# Patient Record
Sex: MALE | URBAN - METROPOLITAN AREA
[De-identification: names, ages, dates, MRNs, and addresses within clinical notes are randomized per-mention and may not be internally consistent; named-entity substitution may affect disease eponyms.]

---

## 2017-08-22 ENCOUNTER — APPOINTMENT (OUTPATIENT)
Age: 52
Setting detail: DERMATOLOGY
End: 2017-09-10

## 2017-08-22 VITALS — WEIGHT: 252.8 LBS | HEART RATE: 89 BPM | OXYGEN SATURATION: 98 % | HEIGHT: 72 IN

## 2017-08-22 DIAGNOSIS — L57.0 ACTINIC KERATOSIS: ICD-10-CM

## 2017-08-22 DIAGNOSIS — D485 NEOPLASM OF UNCERTAIN BEHAVIOR OF SKIN: ICD-10-CM

## 2017-08-22 DIAGNOSIS — D22 MELANOCYTIC NEVI: ICD-10-CM

## 2017-08-22 DIAGNOSIS — L82.1 OTHER SEBORRHEIC KERATOSIS: ICD-10-CM

## 2017-08-22 DIAGNOSIS — L81.4 OTHER MELANIN HYPERPIGMENTATION: ICD-10-CM

## 2017-08-22 PROBLEM — D48.5 NEOPLASM OF UNCERTAIN BEHAVIOR OF SKIN: Status: ACTIVE | Noted: 2017-08-22

## 2017-08-22 PROBLEM — D22.5 MELANOCYTIC NEVI OF TRUNK: Status: ACTIVE | Noted: 2017-08-22

## 2017-08-22 PROCEDURE — 99214 OFFICE O/P EST MOD 30 MIN: CPT | Mod: 25

## 2017-08-22 PROCEDURE — 17003 DESTRUCT PREMALG LES 2-14: CPT

## 2017-08-22 PROCEDURE — OTHER COUNSELING: OTHER

## 2017-08-22 PROCEDURE — 11100: CPT | Mod: 59

## 2017-08-22 PROCEDURE — 17000 DESTRUCT PREMALG LESION: CPT

## 2017-08-22 PROCEDURE — OTHER LIQUID NITROGEN: OTHER

## 2017-08-22 PROCEDURE — OTHER BIOPSY BY SHAVE METHOD: OTHER

## 2017-08-22 ASSESSMENT — LOCATION SIMPLE DESCRIPTION DERM
LOCATION SIMPLE: LEFT LOWER BACK
LOCATION SIMPLE: LEFT ANTERIOR NECK
LOCATION SIMPLE: LEFT HAND
LOCATION SIMPLE: RIGHT LOWER BACK
LOCATION SIMPLE: POSTERIOR SCALP
LOCATION SIMPLE: RIGHT LOWER BACK
LOCATION SIMPLE: LEFT UPPER BACK
LOCATION SIMPLE: ABDOMEN
LOCATION SIMPLE: CHEST

## 2017-08-22 ASSESSMENT — LOCATION DETAILED DESCRIPTION DERM
LOCATION DETAILED: LEFT SUPERIOR OCCIPITAL SCALP
LOCATION DETAILED: LEFT SUPERIOR MEDIAL MIDBACK
LOCATION DETAILED: LEFT MEDIAL SUPERIOR CHEST
LOCATION DETAILED: EPIGASTRIC SKIN
LOCATION DETAILED: RIGHT SUPERIOR LATERAL LOWER BACK
LOCATION DETAILED: RIGHT SUPERIOR LATERAL LOWER BACK
LOCATION DETAILED: LEFT RADIAL DORSAL HAND
LOCATION DETAILED: LEFT MEDIAL UPPER BACK
LOCATION DETAILED: LEFT CLAVICULAR NECK

## 2017-08-22 ASSESSMENT — LOCATION ZONE DERM
LOCATION ZONE: HAND
LOCATION ZONE: NECK
LOCATION ZONE: TRUNK
LOCATION ZONE: SCALP
LOCATION ZONE: TRUNK

## 2017-08-22 NOTE — PROCEDURE: LIQUID NITROGEN
Render Post-Care Instructions In Note?: yes
Duration Of Freeze Thaw-Cycle (Seconds): 3
Post-Care Instructions: I reviewed with the patient in detail post-care instructions. Patient is to wear sunprotection, and avoid picking at any of the treated lesions. Pt may apply Vaseline to crusted or scabbing areas.
Number Of Freeze-Thaw Cycles: 2 freeze-thaw cycles
Detail Level: Detailed
Consent: The patient's verbal consent was obtained including but not limited to risks of crusting, scabbing, blistering, scarring, darker or lighter pigmentary change, recurrence, incomplete removal and infection.

## 2017-08-22 NOTE — PROCEDURE: BIOPSY BY SHAVE METHOD
Billing Type: Third-Party Bill
Silver Nitrate Text: The wound bed was treated with silver nitrate after the biopsy was performed.
Electrodesiccation And Curettage Text: The wound bed was treated with electrodesiccation and curettage after the biopsy was performed.
Bill For Surgical Tray: no
Biopsy Type: H and E
Wound Care: Vaseline
Dressing: bandage
Notification Instructions: Patient will be notified of biopsy results. However, patient instructed to call the office if not contacted within 2 weeks.
Cryotherapy Text: The wound bed was treated with cryotherapy after the biopsy was performed.
Biopsy Method: Double edge Personna blades
Curettage Text: The wound bed was treated with curettage after the biopsy was performed.
Type Of Destruction Used: Curettage
Hemostasis: Electrocautery and Aluminum Chloride
Electrodesiccation Text: The wound bed was treated with electrodesiccation after the biopsy was performed.
Anesthesia Volume In Cc (Will Not Render If 0): 0.5
Post-Care Instructions: I reviewed with the patient in detail post-care instructions. Patient is to keep the biopsy site dry overnight, and then apply Vaseline twice daily until healed.
Additional Anesthesia Volume In Cc (Will Not Render If 0): 0
Consent: Written consent was obtained and risks were reviewed including but not limited to scarring, infection, bleeding, scabbing, incomplete removal, nerve damage and allergy to anesthesia.
Anesthesia Type: 1% lidocaine with epinephrine
Detail Level: Detailed

## 2017-11-20 ENCOUNTER — APPOINTMENT (OUTPATIENT)
Age: 52
Setting detail: DERMATOLOGY
End: 2017-12-27

## 2017-11-20 DIAGNOSIS — L57.0 ACTINIC KERATOSIS: ICD-10-CM

## 2017-11-20 PROCEDURE — OTHER ADDITIONAL NOTES: OTHER

## 2017-11-20 PROCEDURE — OTHER LIQUID NITROGEN: OTHER

## 2017-11-20 PROCEDURE — 17000 DESTRUCT PREMALG LESION: CPT

## 2017-11-20 PROCEDURE — OTHER COUNSELING: OTHER

## 2017-11-20 PROCEDURE — 17003 DESTRUCT PREMALG LES 2-14: CPT

## 2017-11-20 ASSESSMENT — LOCATION SIMPLE DESCRIPTION DERM
LOCATION SIMPLE: LEFT HAND
LOCATION SIMPLE: RIGHT FOREARM
LOCATION SIMPLE: RIGHT HAND

## 2017-11-20 ASSESSMENT — LOCATION DETAILED DESCRIPTION DERM
LOCATION DETAILED: RIGHT DISTAL DORSAL FOREARM
LOCATION DETAILED: RIGHT RADIAL DORSAL HAND
LOCATION DETAILED: RIGHT PROXIMAL DORSAL FOREARM
LOCATION DETAILED: RIGHT DISTAL RADIAL DORSAL FOREARM
LOCATION DETAILED: LEFT ULNAR DORSAL HAND

## 2017-11-20 ASSESSMENT — LOCATION ZONE DERM
LOCATION ZONE: HAND
LOCATION ZONE: ARM

## 2017-11-20 NOTE — PROCEDURE: ADDITIONAL NOTES
Additional Notes: Clear on exam today: left clavicular neck,left medial superior chest, left superior occipital scalp

## 2017-11-20 NOTE — PROCEDURE: LIQUID NITROGEN
Post-Care Instructions: I reviewed with the patient in detail post-care instructions. Patient is to wear sunprotection, and avoid picking at any of the treated lesions. Pt may apply Vaseline to crusted or scabbing areas.
Detail Level: Detailed
Number Of Freeze-Thaw Cycles: 2 freeze-thaw cycles
Render Post-Care Instructions In Note?: yes
Consent: The patient's verbal consent was obtained including but not limited to risks of crusting, scabbing, blistering, scarring, darker or lighter pigmentary change, recurrence, incomplete removal and infection.
Duration Of Freeze Thaw-Cycle (Seconds): 3

## 2017-11-21 VITALS — RESPIRATION RATE: 18 BRPM | WEIGHT: 261.2 LBS | HEIGHT: 74 IN

## 2019-04-03 ENCOUNTER — APPOINTMENT (OUTPATIENT)
Age: 54
Setting detail: DERMATOLOGY
End: 2019-05-15

## 2019-04-03 VITALS — WEIGHT: 269 LBS | HEIGHT: 74 IN | RESPIRATION RATE: 18 BRPM

## 2019-04-03 DIAGNOSIS — D485 NEOPLASM OF UNCERTAIN BEHAVIOR OF SKIN: ICD-10-CM

## 2019-04-03 PROBLEM — D48.5 NEOPLASM OF UNCERTAIN BEHAVIOR OF SKIN: Status: ACTIVE | Noted: 2019-04-03

## 2019-04-03 PROCEDURE — OTHER BIOPSY BY SHAVE METHOD: OTHER

## 2019-04-03 PROCEDURE — OTHER COUNSELING: OTHER

## 2019-04-03 PROCEDURE — 11102 TANGNTL BX SKIN SINGLE LES: CPT

## 2019-04-03 PROCEDURE — OTHER MIPS QUALITY: OTHER

## 2019-04-03 ASSESSMENT — LOCATION SIMPLE DESCRIPTION DERM: LOCATION SIMPLE: LEFT HAND

## 2019-04-03 ASSESSMENT — LOCATION ZONE DERM: LOCATION ZONE: HAND

## 2019-04-03 ASSESSMENT — LOCATION DETAILED DESCRIPTION DERM: LOCATION DETAILED: LEFT RADIAL DORSAL HAND

## 2019-04-03 NOTE — PROCEDURE: BIOPSY BY SHAVE METHOD
Was A Bandage Applied: Yes
Anesthesia Volume In Cc (Will Not Render If 0): 0.5
Dressing: bandage
Curettage Text: The wound bed was treated with curettage after the biopsy was performed.
Bill 08016 For Specimen Handling/Conveyance To Laboratory?: no
Silver Nitrate Text: The wound bed was treated with silver nitrate after the biopsy was performed.
Electrodesiccation Text: The wound bed was treated with electrodesiccation after the biopsy was performed.
Biopsy Method: Dermablade
Biopsy Type: H and E
Depth Of Biopsy: dermis
Hemostasis: Drysol
Cryotherapy Text: The wound bed was treated with cryotherapy after the biopsy was performed.
X Size Of Lesion In Cm: 0
Type Of Destruction Used: Curettage
Wound Care: Petrolatum
Consent: Written consent was obtained and risks were reviewed including but not limited to scarring, infection, bleeding, scabbing, incomplete removal, nerve damage and allergy to anesthesia.
Post-Care Instructions: I reviewed with the patient in detail post-care instructions. Patient is to keep the biopsy site dry overnight, and then apply bacitracin twice daily until healed. Patient may apply hydrogen peroxide soaks to remove any crusting.
Anesthesia Type: 1% lidocaine with epinephrine
Detail Level: Detailed
Billing Type: Third-Party Bill
Notification Instructions: Patient will be notified of biopsy results. However, patient instructed to call the office if not contacted within 2 weeks.
Electrodesiccation And Curettage Text: The wound bed was treated with electrodesiccation and curettage after the biopsy was performed.

## 2019-04-03 NOTE — PROCEDURE: MIPS QUALITY
Additional Notes: The patient will be referred back to the primary care physician for BMI management.
Detail Level: Detailed
Quality 128: Preventive Care And Screening: Body Mass Index (Bmi) Screening And Follow-Up Plan: BMI is documented above normal parameters and a follow-up plan is documented
Quality 226: Preventive Care And Screening: Tobacco Use: Screening And Cessation Intervention: Patient screened for tobacco use and is an ex/non-smoker

## 2019-08-19 ENCOUNTER — APPOINTMENT (OUTPATIENT)
Age: 54
Setting detail: DERMATOLOGY
End: 2019-10-04

## 2019-08-19 VITALS — HEIGHT: 74 IN | RESPIRATION RATE: 18 BRPM | WEIGHT: 276.2 LBS

## 2019-08-19 DIAGNOSIS — L72.0 EPIDERMAL CYST: ICD-10-CM

## 2019-08-19 DIAGNOSIS — L57.0 ACTINIC KERATOSIS: ICD-10-CM

## 2019-08-19 DIAGNOSIS — L82.1 OTHER SEBORRHEIC KERATOSIS: ICD-10-CM

## 2019-08-19 DIAGNOSIS — L73.8 OTHER SPECIFIED FOLLICULAR DISORDERS: ICD-10-CM

## 2019-08-19 DIAGNOSIS — D22 MELANOCYTIC NEVI: ICD-10-CM

## 2019-08-19 PROBLEM — D22.5 MELANOCYTIC NEVI OF TRUNK: Status: ACTIVE | Noted: 2019-08-19

## 2019-08-19 PROCEDURE — OTHER DEFER: OTHER

## 2019-08-19 PROCEDURE — OTHER OBSERVATION AND MEASURE: OTHER

## 2019-08-19 PROCEDURE — 99213 OFFICE O/P EST LOW 20 MIN: CPT

## 2019-08-19 PROCEDURE — OTHER REASSURANCE: OTHER

## 2019-08-19 PROCEDURE — OTHER COUNSELING: OTHER

## 2019-08-19 PROCEDURE — OTHER PHOTO-DOCUMENTATION: OTHER

## 2019-08-19 PROCEDURE — OTHER ADDITIONAL NOTES: OTHER

## 2019-08-19 PROCEDURE — OTHER OBSERVATION: OTHER

## 2019-08-19 ASSESSMENT — LOCATION DETAILED DESCRIPTION DERM
LOCATION DETAILED: RIGHT PROXIMAL DORSAL FOREARM
LOCATION DETAILED: RIGHT RADIAL DORSAL HAND
LOCATION DETAILED: LEFT RADIAL DORSAL HAND
LOCATION DETAILED: LEFT POSTERIOR SHOULDER
LOCATION DETAILED: LEFT MEDIAL FRONTAL SCALP
LOCATION DETAILED: RIGHT RIB CAGE
LOCATION DETAILED: RIGHT CLAVICULAR SKIN

## 2019-08-19 ASSESSMENT — LOCATION SIMPLE DESCRIPTION DERM
LOCATION SIMPLE: LEFT HAND
LOCATION SIMPLE: RIGHT CLAVICULAR SKIN
LOCATION SIMPLE: LEFT SHOULDER
LOCATION SIMPLE: RIGHT HAND
LOCATION SIMPLE: LEFT SCALP
LOCATION SIMPLE: RIGHT FOREARM
LOCATION SIMPLE: ABDOMEN

## 2019-08-19 ASSESSMENT — LOCATION ZONE DERM
LOCATION ZONE: HAND
LOCATION ZONE: ARM
LOCATION ZONE: SCALP
LOCATION ZONE: TRUNK

## 2019-08-19 NOTE — PROCEDURE: DEFER
Reason To Defer Override: patient will consider removal when cyst becomes bothersome
Introduction Text (Please End With A Colon): The following procedure was deferred:
Detail Level: Detailed
Procedure To Be Performed At Next Visit: Biopsy by punch method

## 2019-08-19 NOTE — PROCEDURE: OBSERVATION
Detail Level: Detailed
Size Of Lesion: 0.5x0.4 cm
Morphology Per Location (Optional): Flesh colored smooth papule with no atypical features

## 2020-07-27 ENCOUNTER — APPOINTMENT (OUTPATIENT)
Age: 55
Setting detail: DERMATOLOGY
End: 2020-07-28

## 2020-07-27 VITALS — HEIGHT: 74 IN | TEMPERATURE: 98.6 F | WEIGHT: 272 LBS | RESPIRATION RATE: 18 BRPM

## 2020-07-27 DIAGNOSIS — D485 NEOPLASM OF UNCERTAIN BEHAVIOR OF SKIN: ICD-10-CM

## 2020-07-27 DIAGNOSIS — L20.89 OTHER ATOPIC DERMATITIS: ICD-10-CM

## 2020-07-27 DIAGNOSIS — L0391 CELLULITIS AND ABSCESS OF UNSPECIFIED SITES: ICD-10-CM

## 2020-07-27 DIAGNOSIS — L0390 CELLULITIS AND ABSCESS OF UNSPECIFIED SITES: ICD-10-CM

## 2020-07-27 PROBLEM — L20.84 INTRINSIC (ALLERGIC) ECZEMA: Status: ACTIVE | Noted: 2020-07-27

## 2020-07-27 PROBLEM — L02.211 CUTANEOUS ABSCESS OF ABDOMINAL WALL: Status: ACTIVE | Noted: 2020-07-27

## 2020-07-27 PROBLEM — D48.5 NEOPLASM OF UNCERTAIN BEHAVIOR OF SKIN: Status: ACTIVE | Noted: 2020-07-27

## 2020-07-27 PROCEDURE — OTHER BIOPSY BY SHAVE METHOD: OTHER

## 2020-07-27 PROCEDURE — OTHER COUNSELING: OTHER

## 2020-07-27 PROCEDURE — 11102 TANGNTL BX SKIN SINGLE LES: CPT

## 2020-07-27 PROCEDURE — OTHER TREATMENT REGIMEN: OTHER

## 2020-07-27 PROCEDURE — OTHER PHOTO-DOCUMENTATION: OTHER

## 2020-07-27 PROCEDURE — 11103 TANGNTL BX SKIN EA SEP/ADDL: CPT

## 2020-07-27 PROCEDURE — OTHER PRESCRIPTION: OTHER

## 2020-07-27 PROCEDURE — 99213 OFFICE O/P EST LOW 20 MIN: CPT | Mod: 25

## 2020-07-27 PROCEDURE — OTHER MEDICATION COUNSELING: OTHER

## 2020-07-27 RX ORDER — CLOBETASOL PROPIONATE 0.5 MG/G
CREAM TOPICAL BID
Qty: 1 | Refills: 2 | Status: ERX | COMMUNITY
Start: 2020-07-27

## 2020-07-27 RX ORDER — DOXYCYCLINE HYCLATE 100 MG/1
TABLET, COATED ORAL
Qty: 20 | Refills: 0 | Status: ERX | COMMUNITY
Start: 2020-07-27

## 2020-07-27 ASSESSMENT — LOCATION SIMPLE DESCRIPTION DERM
LOCATION SIMPLE: ABDOMEN
LOCATION SIMPLE: RIGHT PRETIBIAL REGION
LOCATION SIMPLE: LEFT SCALP
LOCATION SIMPLE: POSTERIOR SCALP

## 2020-07-27 ASSESSMENT — LOCATION DETAILED DESCRIPTION DERM
LOCATION DETAILED: RIGHT PROXIMAL PRETIBIAL REGION
LOCATION DETAILED: LEFT CENTRAL FRONTAL SCALP
LOCATION DETAILED: RIGHT RIB CAGE
LOCATION DETAILED: RIGHT LATERAL ABDOMEN
LOCATION DETAILED: POSTERIOR MID-PARIETAL SCALP

## 2020-07-27 ASSESSMENT — LOCATION ZONE DERM
LOCATION ZONE: TRUNK
LOCATION ZONE: LEG
LOCATION ZONE: SCALP

## 2020-07-27 NOTE — PROCEDURE: BIOPSY BY SHAVE METHOD
Hide Topical Anesthesia?: No
Anesthesia Volume In Cc (Will Not Render If 0): 0.5
Electrodesiccation Text: The wound bed was treated with electrodesiccation after the biopsy was performed.
Size Of Lesion In Cm: 0
Detail Level: Detailed
Post-Care Instructions: I reviewed with the patient in detail post-care instructions. Patient is to keep the biopsy site dry overnight, and then apply bacitracin twice daily until healed. Patient may apply hydrogen peroxide soaks to remove any crusting.
Anesthesia Type: 1% lidocaine with epinephrine
Biopsy Method: Dermablade
Depth Of Biopsy: dermis
Electrodesiccation And Curettage Text: The wound bed was treated with electrodesiccation and curettage after the biopsy was performed.
Consent: Written consent was obtained and risks were reviewed including but not limited to scarring, infection, bleeding, scabbing, incomplete removal, nerve damage and allergy to anesthesia.
Type Of Destruction Used: Curettage
Wound Care: Petrolatum
Hemostasis: Drysol
Billing Type: Third-Party Bill
Cryotherapy Text: The wound bed was treated with cryotherapy after the biopsy was performed.
Was A Bandage Applied: Yes
Biopsy Type: H and E
Dressing: bandage
Notification Instructions: Patient will be notified of biopsy results. However, patient instructed to call the office if not contacted within 2 weeks.
Silver Nitrate Text: The wound bed was treated with silver nitrate after the biopsy was performed.
Information: Selecting Yes will display possible errors in your note based on the variables you have selected. This validation is only offered as a suggestion for you. PLEASE NOTE THAT THE VALIDATION TEXT WILL BE REMOVED WHEN YOU FINALIZE YOUR NOTE. IF YOU WANT TO FAX A PRELIMINARY NOTE YOU WILL NEED TO TOGGLE THIS TO 'NO' IF YOU DO NOT WANT IT IN YOUR FAXED NOTE.
Curettage Text: The wound bed was treated with curettage after the biopsy was performed.

## 2020-07-27 NOTE — PROCEDURE: MEDICATION COUNSELING
Xelmelz Pregnancy And Lactation Text: This medication is Pregnancy Category D and is not considered safe during pregnancy.  The risk during breast feeding is also uncertain.

## 2020-07-27 NOTE — HPI: RASH
What Type Of Note Output Would You Prefer (Optional)?: Bullet Format
How Severe Is Your Rash?: moderate
Is This A New Presentation, Or A Follow-Up?: Rash
Additional History: improves with clobetasol

## 2020-07-27 NOTE — PROCEDURE: TREATMENT REGIMEN
Initiate Treatment: doxycycline hyclate 100 mg tablet. Take 1 pill po twice a day x 10 days with food
Plan: Return to clinic as needed
Continue Regimen: clobetasol 0.05 % topical cream. apply a thin layer to affected areas BID x 2 weeks stop x 1 week. Repeat as needed
Detail Level: Zone

## 2020-08-27 ENCOUNTER — APPOINTMENT (OUTPATIENT)
Age: 55
Setting detail: DERMATOLOGY
End: 2020-09-01

## 2020-08-27 ENCOUNTER — RX ONLY (RX ONLY)
Age: 55
End: 2020-08-27

## 2020-08-27 PROBLEM — D04.4 CARCINOMA IN SITU OF SKIN OF SCALP AND NECK: Status: ACTIVE | Noted: 2020-08-27

## 2020-08-27 PROCEDURE — 13121 CMPLX RPR S/A/L 2.6-7.5 CM: CPT

## 2020-08-27 PROCEDURE — OTHER MOHS SURGERY: OTHER

## 2020-08-27 PROCEDURE — 17311 MOHS 1 STAGE H/N/HF/G: CPT

## 2020-08-27 RX ORDER — CEPHALEXIN 500 MG/1
CAPSULE ORAL
Qty: 21 | Refills: 0 | Status: ERX | COMMUNITY
Start: 2020-08-27

## 2020-09-24 ENCOUNTER — APPOINTMENT (OUTPATIENT)
Age: 55
Setting detail: DERMATOLOGY
End: 2020-09-25

## 2020-09-24 ENCOUNTER — RX ONLY (RX ONLY)
Age: 55
End: 2020-09-24

## 2020-09-24 PROBLEM — D04.4 CARCINOMA IN SITU OF SKIN OF SCALP AND NECK: Status: ACTIVE | Noted: 2020-09-24

## 2020-09-24 PROCEDURE — OTHER MOHS SURGERY: OTHER

## 2020-09-24 PROCEDURE — 17311 MOHS 1 STAGE H/N/HF/G: CPT

## 2020-09-24 RX ORDER — MUPIROCIN 20 MG/G
OINTMENT TOPICAL
Qty: 1 | Refills: 0 | Status: ERX | COMMUNITY
Start: 2020-09-24

## 2020-09-24 NOTE — PROCEDURE: MOHS SURGERY
"Chief Complaint   Patient presents with     Kidney Problem       Initial /78 (BP Location: Right arm, Cuff Size: Adult Large)  Pulse 98  Temp 98  F (36.7  C) (Oral)  Ht 5' 7\" (1.702 m)  Wt 274 lb (124.3 kg)  SpO2 96%  BMI 42.91 kg/m2 Estimated body mass index is 42.91 kg/(m^2) as calculated from the following:    Height as of this encounter: 5' 7\" (1.702 m).    Weight as of this encounter: 274 lb (124.3 kg).  Medication Reconciliation: complete   Estela Guevara MA    " Crescentic Intermediate Repair Preamble Text (Leave Blank If You Do Not Want): Undermining was performed with blunt dissection.

## 2021-02-04 ENCOUNTER — APPOINTMENT (OUTPATIENT)
Age: 56
Setting detail: DERMATOLOGY
End: 2021-02-16

## 2021-02-04 VITALS — HEIGHT: 74 IN | RESPIRATION RATE: 18 BRPM | TEMPERATURE: 97.8 F | WEIGHT: 278 LBS

## 2021-02-04 DIAGNOSIS — L20.89 OTHER ATOPIC DERMATITIS: ICD-10-CM

## 2021-02-04 DIAGNOSIS — Z86.007 PERSONAL HISTORY OF IN-SITU NEOPLASM OF SKIN: ICD-10-CM

## 2021-02-04 DIAGNOSIS — L81.4 OTHER MELANIN HYPERPIGMENTATION: ICD-10-CM

## 2021-02-04 DIAGNOSIS — L82.1 OTHER SEBORRHEIC KERATOSIS: ICD-10-CM

## 2021-02-04 DIAGNOSIS — D22 MELANOCYTIC NEVI: ICD-10-CM

## 2021-02-04 DIAGNOSIS — Z71.89 OTHER SPECIFIED COUNSELING: ICD-10-CM

## 2021-02-04 DIAGNOSIS — D18.0 HEMANGIOMA: ICD-10-CM

## 2021-02-04 DIAGNOSIS — D49.2 NEOPLASM OF UNSPECIFIED BEHAVIOR OF BONE, SOFT TISSUE, AND SKIN: ICD-10-CM

## 2021-02-04 DIAGNOSIS — L72.8 OTHER FOLLICULAR CYSTS OF THE SKIN AND SUBCUTANEOUS TISSUE: ICD-10-CM

## 2021-02-04 PROBLEM — D18.01 HEMANGIOMA OF SKIN AND SUBCUTANEOUS TISSUE: Status: ACTIVE | Noted: 2021-02-04

## 2021-02-04 PROBLEM — D22.5 MELANOCYTIC NEVI OF TRUNK: Status: ACTIVE | Noted: 2021-02-04

## 2021-02-04 PROBLEM — L20.84 INTRINSIC (ALLERGIC) ECZEMA: Status: ACTIVE | Noted: 2021-02-04

## 2021-02-04 PROBLEM — Z85.828 PERSONAL HISTORY OF OTHER MALIGNANT NEOPLASM OF SKIN: Status: ACTIVE | Noted: 2021-02-04

## 2021-02-04 PROCEDURE — OTHER COUNSELING: OTHER

## 2021-02-04 PROCEDURE — OTHER OBSERVATION: OTHER

## 2021-02-04 PROCEDURE — 99213 OFFICE O/P EST LOW 20 MIN: CPT

## 2021-02-04 PROCEDURE — OTHER PRESCRIPTION MEDICATION MANAGEMENT: OTHER

## 2021-02-04 PROCEDURE — OTHER ADDITIONAL NOTES: OTHER

## 2021-02-04 PROCEDURE — OTHER MIPS QUALITY: OTHER

## 2021-02-04 ASSESSMENT — LOCATION DETAILED DESCRIPTION DERM
LOCATION DETAILED: RIGHT RIB CAGE
LOCATION DETAILED: RIGHT DORSAL FOOT
LOCATION DETAILED: RIGHT INFERIOR MEDIAL UPPER BACK
LOCATION DETAILED: PERIUMBILICAL SKIN
LOCATION DETAILED: SUPERIOR MID FOREHEAD
LOCATION DETAILED: LEFT DORSAL FOOT
LOCATION DETAILED: EPIGASTRIC SKIN

## 2021-02-04 ASSESSMENT — LOCATION SIMPLE DESCRIPTION DERM
LOCATION SIMPLE: SUPERIOR FOREHEAD
LOCATION SIMPLE: RIGHT UPPER BACK
LOCATION SIMPLE: LEFT FOOT
LOCATION SIMPLE: RIGHT FOOT
LOCATION SIMPLE: ABDOMEN

## 2021-02-04 ASSESSMENT — LOCATION ZONE DERM
LOCATION ZONE: TRUNK
LOCATION ZONE: FACE
LOCATION ZONE: FEET

## 2021-02-04 NOTE — PROCEDURE: ADDITIONAL NOTES
Additional Notes: Posterior mid Parietal scalp- Mohs Dr.Smith 8/27/2020 \\n left central frontal scalp, Mohs 09/24/20 w/Dr. Ambriz
Detail Level: Zone
Render Risk Assessment In Note?: no

## 2021-02-04 NOTE — PROCEDURE: PRESCRIPTION MEDICATION MANAGEMENT
Render In Strict Bullet Format?: No
Plan: Monitor unless lesion becomes bothersome or increases in size
Plan: Offered topical steroid patient declined he will continue using \\nOTC Debbie
Detail Level: Zone

## 2021-05-10 ENCOUNTER — APPOINTMENT (OUTPATIENT)
Age: 56
Setting detail: DERMATOLOGY
End: 2021-05-11

## 2021-05-10 VITALS — TEMPERATURE: 98 F | WEIGHT: 278 LBS | RESPIRATION RATE: 18 BRPM | HEIGHT: 60 IN

## 2021-05-10 DIAGNOSIS — B07.8 OTHER VIRAL WARTS: ICD-10-CM

## 2021-05-10 PROCEDURE — OTHER COUNSELING: OTHER

## 2021-05-10 PROCEDURE — 17110 DESTRUCT B9 LESION 1-14: CPT

## 2021-05-10 PROCEDURE — OTHER BENIGN DESTRUCTION: OTHER

## 2021-05-10 ASSESSMENT — LOCATION SIMPLE DESCRIPTION DERM: LOCATION SIMPLE: RIGHT HAND

## 2021-05-10 ASSESSMENT — LOCATION DETAILED DESCRIPTION DERM: LOCATION DETAILED: RIGHT DORSAL SMALL METACARPOPHALANGEAL JOINT

## 2021-05-10 ASSESSMENT — LOCATION ZONE DERM: LOCATION ZONE: HAND

## 2021-05-10 NOTE — PROCEDURE: BENIGN DESTRUCTION
Add 52 Modifier (Optional): no
Anesthesia Volume In Cc: 0.5
Consent: The patient's consent was obtained including but not limited to risks of crusting, scabbing, blistering, scarring, darker or lighter pigmentary change, recurrence, incomplete removal and infection.
Post-Care Instructions: I reviewed with the patient in detail post-care instructions. Patient is to wear sunprotection, and avoid picking at any of the treated lesions. Pt may apply Vaseline to crusted or scabbing areas.
Detail Level: Detailed
Medical Necessity Information: It is in your best interest to select a reason for this procedure from the list below. All of these items fulfill various CMS LCD requirements except the new and changing color options.
Treatment Number (Will Not Render If 0): 0
Medical Necessity Clause: This procedure was medically necessary because the lesions that were treated were:

## 2021-06-21 ENCOUNTER — APPOINTMENT (OUTPATIENT)
Dept: URBAN - METROPOLITAN AREA CLINIC 265 | Age: 56
Setting detail: DERMATOLOGY
End: 2021-06-22

## 2021-06-21 VITALS — WEIGHT: 278 LBS | RESPIRATION RATE: 18 BRPM | HEIGHT: 74 IN

## 2021-06-21 DIAGNOSIS — B07.8 OTHER VIRAL WARTS: ICD-10-CM

## 2021-06-21 PROCEDURE — 17110 DESTRUCT B9 LESION 1-14: CPT

## 2021-06-21 PROCEDURE — OTHER BENIGN DESTRUCTION: OTHER

## 2021-06-21 PROCEDURE — OTHER COUNSELING: OTHER

## 2021-06-21 ASSESSMENT — LOCATION ZONE DERM: LOCATION ZONE: HAND

## 2021-06-21 ASSESSMENT — LOCATION SIMPLE DESCRIPTION DERM: LOCATION SIMPLE: RIGHT HAND

## 2021-06-21 ASSESSMENT — LOCATION DETAILED DESCRIPTION DERM: LOCATION DETAILED: RIGHT DORSAL SMALL METACARPOPHALANGEAL JOINT

## 2021-06-21 NOTE — PROCEDURE: BENIGN DESTRUCTION
Treatment Number (Will Not Render If 0): 0
Anesthesia Volume In Cc: 0.5
Medical Necessity Clause: This procedure was medically necessary because the lesions that were treated were:
Post-Care Instructions: I reviewed with the patient in detail post-care instructions. Patient is to wear sunprotection, and avoid picking at any of the treated lesions. Pt may apply Vaseline to crusted or scabbing areas.
Render Note In Bullet Format When Appropriate: No
Detail Level: Simple
Medical Necessity Information: It is in your best interest to select a reason for this procedure from the list below. All of these items fulfill various CMS LCD requirements except the new and changing color options.
Consent: The patient's consent was obtained including but not limited to risks of crusting, scabbing, blistering, scarring, darker or lighter pigmentary change, recurrence, incomplete removal and infection.

## 2022-02-14 ENCOUNTER — APPOINTMENT (OUTPATIENT)
Dept: URBAN - METROPOLITAN AREA CLINIC 265 | Age: 57
Setting detail: DERMATOLOGY
End: 2022-04-19

## 2022-02-14 VITALS — WEIGHT: 264 LBS | HEIGHT: 74 IN

## 2022-02-14 DIAGNOSIS — Z71.89 OTHER SPECIFIED COUNSELING: ICD-10-CM

## 2022-02-14 DIAGNOSIS — L57.0 ACTINIC KERATOSIS: ICD-10-CM

## 2022-02-14 DIAGNOSIS — L81.4 OTHER MELANIN HYPERPIGMENTATION: ICD-10-CM

## 2022-02-14 DIAGNOSIS — D18.0 HEMANGIOMA: ICD-10-CM

## 2022-02-14 DIAGNOSIS — L82.1 OTHER SEBORRHEIC KERATOSIS: ICD-10-CM

## 2022-02-14 DIAGNOSIS — D22 MELANOCYTIC NEVI: ICD-10-CM

## 2022-02-14 DIAGNOSIS — Z86.007 PERSONAL HISTORY OF IN-SITU NEOPLASM OF SKIN: ICD-10-CM

## 2022-02-14 PROBLEM — D18.01 HEMANGIOMA OF SKIN AND SUBCUTANEOUS TISSUE: Status: ACTIVE | Noted: 2022-02-14

## 2022-02-14 PROBLEM — D22.5 MELANOCYTIC NEVI OF TRUNK: Status: ACTIVE | Noted: 2022-02-14

## 2022-02-14 PROCEDURE — OTHER ADDITIONAL NOTES: OTHER

## 2022-02-14 PROCEDURE — OTHER COUNSELING: OTHER

## 2022-02-14 PROCEDURE — OTHER LIQUID NITROGEN: OTHER

## 2022-02-14 PROCEDURE — 17003 DESTRUCT PREMALG LES 2-14: CPT

## 2022-02-14 PROCEDURE — 99213 OFFICE O/P EST LOW 20 MIN: CPT | Mod: 25

## 2022-02-14 PROCEDURE — OTHER MIPS QUALITY: OTHER

## 2022-02-14 PROCEDURE — 17000 DESTRUCT PREMALG LESION: CPT

## 2022-02-14 ASSESSMENT — LOCATION SIMPLE DESCRIPTION DERM
LOCATION SIMPLE: RIGHT UPPER BACK
LOCATION SIMPLE: RIGHT FOREHEAD
LOCATION SIMPLE: LEFT TEMPLE
LOCATION SIMPLE: ABDOMEN
LOCATION SIMPLE: RIGHT CHEEK
LOCATION SIMPLE: LEFT FOREARM

## 2022-02-14 ASSESSMENT — LOCATION DETAILED DESCRIPTION DERM
LOCATION DETAILED: EPIGASTRIC SKIN
LOCATION DETAILED: PERIUMBILICAL SKIN
LOCATION DETAILED: RIGHT SUPERIOR PREAURICULAR CHEEK
LOCATION DETAILED: RIGHT SUPERIOR MEDIAL FOREHEAD
LOCATION DETAILED: RIGHT INFERIOR MEDIAL UPPER BACK
LOCATION DETAILED: LEFT DISTAL DORSAL FOREARM
LOCATION DETAILED: LEFT CENTRAL TEMPLE

## 2022-02-14 ASSESSMENT — LOCATION ZONE DERM
LOCATION ZONE: TRUNK
LOCATION ZONE: FACE
LOCATION ZONE: ARM

## 2022-02-14 NOTE — PROCEDURE: LIQUID NITROGEN
Post-Care Instructions: I reviewed with the patient in detail post-care instructions. Patient is to wear sunprotection, and avoid picking at any of the treated lesions. Pt may apply Vaseline to crusted or scabbing areas.
Detail Level: Zone
Duration Of Freeze Thaw-Cycle (Seconds): 3
Render Note In Bullet Format When Appropriate: No
Number Of Freeze-Thaw Cycles: 2 freeze-thaw cycles
Consent: The patient's consent was obtained including but not limited to risks of crusting, scabbing, blistering, scarring, darker or lighter pigmentary change, recurrence, incomplete removal and infection.
Show Applicator Variable?: Yes

## 2022-09-12 ENCOUNTER — APPOINTMENT (OUTPATIENT)
Dept: URBAN - METROPOLITAN AREA CLINIC 265 | Age: 57
Setting detail: DERMATOLOGY
End: 2022-09-12

## 2022-09-12 VITALS — HEIGHT: 74 IN | WEIGHT: 276 LBS | RESPIRATION RATE: 18 BRPM

## 2022-09-12 DIAGNOSIS — L82.0 INFLAMED SEBORRHEIC KERATOSIS: ICD-10-CM

## 2022-09-12 PROCEDURE — OTHER COUNSELING: OTHER

## 2022-09-12 PROCEDURE — OTHER MIPS QUALITY: OTHER

## 2022-09-12 PROCEDURE — OTHER LIQUID NITROGEN: OTHER

## 2022-09-12 PROCEDURE — 17110 DESTRUCT B9 LESION 1-14: CPT

## 2022-09-12 ASSESSMENT — LOCATION ZONE DERM
LOCATION ZONE: HAND
LOCATION ZONE: SCALP
LOCATION ZONE: ARM

## 2022-09-12 ASSESSMENT — LOCATION DETAILED DESCRIPTION DERM
LOCATION DETAILED: 4TH WEB SPACE LEFT HAND
LOCATION DETAILED: 3RD WEB SPACE LEFT HAND
LOCATION DETAILED: LEFT MEDIAL FRONTAL SCALP
LOCATION DETAILED: LEFT DISTAL DORSAL FOREARM
LOCATION DETAILED: LEFT RADIAL DORSAL HAND
LOCATION DETAILED: LEFT ULNAR DORSAL HAND

## 2022-09-12 ASSESSMENT — LOCATION SIMPLE DESCRIPTION DERM
LOCATION SIMPLE: LEFT FOREARM
LOCATION SIMPLE: LEFT HAND
LOCATION SIMPLE: LEFT SCALP

## 2022-09-12 NOTE — PROCEDURE: LIQUID NITROGEN
Spray Paint Technique: No
Show Topical Anesthesia Variable?: Yes
Number Of Freeze-Thaw Cycles: 2 freeze-thaw cycles
Duration Of Freeze Thaw-Cycle (Seconds): 3
Medical Necessity Information: It is in your best interest to select a reason for this procedure from the list below. All of these items fulfill various CMS LCD requirements except the new and changing color options.
Medical Necessity Clause: This procedure was medically necessary because the lesions that were treated were:
Post-Care Instructions: I reviewed with the patient in detail post-care instructions. Patient is to wear sunprotection, and avoid picking at any of the treated lesions. Pt may apply Vaseline to crusted or scabbing areas.
Detail Level: Detailed
Spray Paint Text: The liquid nitrogen was applied to the skin utilizing a spray paint frosting technique.
Consent: The patient's consent was obtained including but not limited to risks of crusting, scabbing, blistering, scarring, darker or lighter pigmentary change, recurrence, incomplete removal and infection.

## 2023-02-20 ENCOUNTER — APPOINTMENT (OUTPATIENT)
Dept: URBAN - METROPOLITAN AREA CLINIC 265 | Age: 58
Setting detail: DERMATOLOGY
End: 2023-02-25

## 2023-02-20 DIAGNOSIS — D22 MELANOCYTIC NEVI: ICD-10-CM

## 2023-02-20 DIAGNOSIS — Z85.828 PERSONAL HISTORY OF OTHER MALIGNANT NEOPLASM OF SKIN: ICD-10-CM

## 2023-02-20 DIAGNOSIS — D49.2 NEOPLASM OF UNSPECIFIED BEHAVIOR OF BONE, SOFT TISSUE, AND SKIN: ICD-10-CM

## 2023-02-20 DIAGNOSIS — L57.0 ACTINIC KERATOSIS: ICD-10-CM

## 2023-02-20 DIAGNOSIS — L82.1 OTHER SEBORRHEIC KERATOSIS: ICD-10-CM

## 2023-02-20 DIAGNOSIS — D18.0 HEMANGIOMA: ICD-10-CM

## 2023-02-20 DIAGNOSIS — L81.4 OTHER MELANIN HYPERPIGMENTATION: ICD-10-CM

## 2023-02-20 DIAGNOSIS — Z71.89 OTHER SPECIFIED COUNSELING: ICD-10-CM

## 2023-02-20 PROBLEM — D18.01 HEMANGIOMA OF SKIN AND SUBCUTANEOUS TISSUE: Status: ACTIVE | Noted: 2023-02-20

## 2023-02-20 PROBLEM — D22.5 MELANOCYTIC NEVI OF TRUNK: Status: ACTIVE | Noted: 2023-02-20

## 2023-02-20 PROCEDURE — OTHER COUNSELING: OTHER

## 2023-02-20 PROCEDURE — 99213 OFFICE O/P EST LOW 20 MIN: CPT | Mod: 25

## 2023-02-20 PROCEDURE — 11103 TANGNTL BX SKIN EA SEP/ADDL: CPT

## 2023-02-20 PROCEDURE — OTHER MIPS QUALITY: OTHER

## 2023-02-20 PROCEDURE — 17003 DESTRUCT PREMALG LES 2-14: CPT

## 2023-02-20 PROCEDURE — OTHER BIOPSY BY SHAVE METHOD: OTHER

## 2023-02-20 PROCEDURE — 11102 TANGNTL BX SKIN SINGLE LES: CPT

## 2023-02-20 PROCEDURE — OTHER ADDITIONAL NOTES: OTHER

## 2023-02-20 PROCEDURE — 17000 DESTRUCT PREMALG LESION: CPT | Mod: 59

## 2023-02-20 PROCEDURE — OTHER LIQUID NITROGEN: OTHER

## 2023-02-20 ASSESSMENT — LOCATION DETAILED DESCRIPTION DERM
LOCATION DETAILED: RIGHT INFERIOR MEDIAL UPPER BACK
LOCATION DETAILED: PERIUMBILICAL SKIN
LOCATION DETAILED: LEFT ULNAR DORSAL HAND
LOCATION DETAILED: LEFT RADIAL DORSAL HAND
LOCATION DETAILED: LEFT DORSAL MIDDLE METACARPOPHALANGEAL JOINT
LOCATION DETAILED: EPIGASTRIC SKIN

## 2023-02-20 ASSESSMENT — LOCATION SIMPLE DESCRIPTION DERM
LOCATION SIMPLE: LEFT HAND
LOCATION SIMPLE: RIGHT UPPER BACK
LOCATION SIMPLE: ABDOMEN

## 2023-02-20 ASSESSMENT — LOCATION ZONE DERM
LOCATION ZONE: TRUNK
LOCATION ZONE: HAND

## 2023-02-20 NOTE — PROCEDURE: BIOPSY BY SHAVE METHOD
Information: Selecting Yes will display possible errors in your note based on the variables you have selected. This validation is only offered as a suggestion for you. PLEASE NOTE THAT THE VALIDATION TEXT WILL BE REMOVED WHEN YOU FINALIZE YOUR NOTE. IF YOU WANT TO FAX A PRELIMINARY NOTE YOU WILL NEED TO TOGGLE THIS TO 'NO' IF YOU DO NOT WANT IT IN YOUR FAXED NOTE.
Hide Second Anesthesia?: No
Biopsy Method: Dermablade
Anesthesia Type: 1% lidocaine with epinephrine
Electrodesiccation Text: The wound bed was treated with electrodesiccation after the biopsy was performed.
Anesthesia Volume In Cc (Will Not Render If 0): 0.5
Was A Bandage Applied: Yes
Cryotherapy Text: The wound bed was treated with cryotherapy after the biopsy was performed.
Biopsy Type: H and E
Type Of Destruction Used: Curettage
Wound Care: Petrolatum
X Size Of Lesion In Cm: 0.4
Billing Type: Third-Party Bill
Notification Instructions: Patient will be notified of biopsy results. However, patient instructed to call the office if not contacted within 2 weeks.
Curettage Text: The wound bed was treated with curettage after the biopsy was performed.
X Size Of Lesion In Cm: 0.6
Depth Of Biopsy: dermis
Additional Anesthesia Volume In Cc (Will Not Render If 0): 0
Post-Care Instructions: I reviewed with the patient in detail post-care instructions. Patient is to keep the biopsy site dry overnight, and then apply bacitracin twice daily until healed. Patient may apply hydrogen peroxide soaks to remove any crusting.
Silver Nitrate Text: The wound bed was treated with silver nitrate after the biopsy was performed.
Hemostasis: Drysol
Consent: Written consent was obtained and risks were reviewed including but not limited to scarring, infection, bleeding, scabbing, incomplete removal, nerve damage and allergy to anesthesia.
Detail Level: Detailed
Dressing: bandage
Electrodesiccation And Curettage Text: The wound bed was treated with electrodesiccation and curettage after the biopsy was performed.

## 2023-02-20 NOTE — PROCEDURE: LIQUID NITROGEN
Post-Care Instructions: I reviewed with the patient in detail post-care instructions. Patient is to wear sunprotection, and avoid picking at any of the treated lesions. Pt may apply Vaseline to crusted or scabbing areas.
Detail Level: Detailed
Number Of Freeze-Thaw Cycles: 2 freeze-thaw cycles
Duration Of Freeze Thaw-Cycle (Seconds): 3
Consent: The patient's consent was obtained including but not limited to risks of crusting, scabbing, blistering, scarring, darker or lighter pigmentary change, recurrence, incomplete removal and infection.
Render Note In Bullet Format When Appropriate: No
Show Applicator Variable?: Yes

## 2023-06-14 ENCOUNTER — APPOINTMENT (OUTPATIENT)
Dept: URBAN - METROPOLITAN AREA CLINIC 298 | Age: 58
Setting detail: DERMATOLOGY
End: 2023-06-14

## 2023-06-14 DIAGNOSIS — L57.0 ACTINIC KERATOSIS: ICD-10-CM

## 2023-06-14 PROCEDURE — OTHER LIQUID NITROGEN: OTHER

## 2023-06-14 PROCEDURE — OTHER COUNSELING: OTHER

## 2023-06-14 PROCEDURE — OTHER MIPS QUALITY: OTHER

## 2023-06-14 PROCEDURE — 17003 DESTRUCT PREMALG LES 2-14: CPT

## 2023-06-14 PROCEDURE — 17000 DESTRUCT PREMALG LESION: CPT

## 2023-06-14 ASSESSMENT — LOCATION SIMPLE DESCRIPTION DERM
LOCATION SIMPLE: LEFT FOREHEAD
LOCATION SIMPLE: SCALP
LOCATION SIMPLE: LEFT HAND

## 2023-06-14 ASSESSMENT — LOCATION ZONE DERM
LOCATION ZONE: SCALP
LOCATION ZONE: HAND
LOCATION ZONE: FACE

## 2023-06-14 ASSESSMENT — LOCATION DETAILED DESCRIPTION DERM
LOCATION DETAILED: LEFT SUPERIOR MEDIAL FOREHEAD
LOCATION DETAILED: LEFT RADIAL DORSAL HAND
LOCATION DETAILED: LEFT DORSAL MIDDLE METACARPOPHALANGEAL JOINT
LOCATION DETAILED: LEFT SUPERIOR PARIETAL SCALP

## 2023-06-14 NOTE — PROCEDURE: LIQUID NITROGEN
Consent: The patient's consent was obtained including but not limited to risks of crusting, scabbing, blistering, scarring, darker or lighter pigmentary change, recurrence, incomplete removal and infection.
Show Aperture Variable?: Yes
Post-Care Instructions: I reviewed with the patient in detail post-care instructions. Patient is to wear sunprotection, and avoid picking at any of the treated lesions. Pt may apply Vaseline to crusted or scabbing areas.
Duration Of Freeze Thaw-Cycle (Seconds): 3
Render Note In Bullet Format When Appropriate: No
Detail Level: Zone
Number Of Freeze-Thaw Cycles: 2 freeze-thaw cycles

## 2023-11-01 ENCOUNTER — APPOINTMENT (OUTPATIENT)
Dept: URBAN - METROPOLITAN AREA CLINIC 298 | Age: 58
Setting detail: DERMATOLOGY
End: 2023-11-01

## 2023-11-01 VITALS — WEIGHT: 270 LBS | RESPIRATION RATE: 18 BRPM | HEIGHT: 74 IN

## 2023-11-01 DIAGNOSIS — D49.2 NEOPLASM OF UNSPECIFIED BEHAVIOR OF BONE, SOFT TISSUE, AND SKIN: ICD-10-CM

## 2023-11-01 PROCEDURE — 11102 TANGNTL BX SKIN SINGLE LES: CPT

## 2023-11-01 PROCEDURE — OTHER MIPS QUALITY: OTHER

## 2023-11-01 PROCEDURE — OTHER COUNSELING: OTHER

## 2023-11-01 PROCEDURE — OTHER BIOPSY BY SHAVE METHOD: OTHER

## 2023-11-01 ASSESSMENT — LOCATION DETAILED DESCRIPTION DERM: LOCATION DETAILED: MID-FRONTAL SCALP

## 2023-11-01 ASSESSMENT — LOCATION ZONE DERM: LOCATION ZONE: SCALP

## 2023-11-01 ASSESSMENT — LOCATION SIMPLE DESCRIPTION DERM: LOCATION SIMPLE: ANTERIOR SCALP

## 2023-11-21 ENCOUNTER — APPOINTMENT (OUTPATIENT)
Dept: URBAN - METROPOLITAN AREA CLINIC 298 | Age: 58
Setting detail: DERMATOLOGY
End: 2023-11-21

## 2023-11-21 PROBLEM — D04.4 CARCINOMA IN SITU OF SKIN OF SCALP AND NECK: Status: ACTIVE | Noted: 2023-11-21

## 2023-11-21 PROCEDURE — OTHER CURETTAGE AND DESTRUCTION: OTHER

## 2023-11-21 PROCEDURE — OTHER COUNSELING: OTHER

## 2023-11-21 PROCEDURE — 17271 DSTR MAL LES S/N/H/F/G 0.6-1: CPT

## 2023-11-21 PROCEDURE — OTHER MIPS QUALITY: OTHER

## 2023-11-21 NOTE — PROCEDURE: CURETTAGE AND DESTRUCTION
Detail Level: Detailed
Biopsy Photograph Reviewed: Yes
Number Of Curettages: 3
Size Of Lesion In Cm: 0.4
Size Of Lesion After Curettage: 1
Add Intralesional Injection: No
Anesthesia Type: 1% lidocaine with epinephrine
Anesthesia Volume In Cc: 0.5
Cautery Type: electrodesiccation
What Was Performed First?: Curettage
Final Size Statement: The size of the lesion after curettage was
Additional Information: (Optional): The wound was cleaned, and a pressure dressing was applied.  The patient received detailed post-op instructions.
Consent was obtained from the patient. The risks, benefits and alternatives to therapy were discussed in detail. Specifically, the risks of infection, scarring, bleeding, prolonged wound healing, nerve injury, incomplete removal, allergy to anesthesia and recurrence were addressed. Alternatives to ED&C, such as: surgical removal and XRT were also discussed.  Prior to the procedure, the treatment site was clearly identified and confirmed by the patient. All components of Universal Protocol/PAUSE Rule completed.
Post-Care Instructions: I reviewed with the patient in detail post-care instructions. Patient is to keep the area dry for 48 hours, and not to engage in any swimming until the area is healed. Should the patient develop any fevers, chills, bleeding, severe pain patient will contact the office immediately.
Bill As A Line Item Or As Units: Line Item

## 2023-12-26 NOTE — PROCEDURE: MOHS SURGERY
FYI: Gallego was removed. 400 mLs urine drained while patient walked to toilet and sat.    Consent (Temporal Branch)/Introductory Paragraph: The rationale for Mohs was explained to the patient and consent was obtained. The risks, benefits and alternatives to therapy were discussed in detail. Specifically, the risks of damage to the temporal branch of the facial nerve, infection, scarring, bleeding, prolonged wound healing, incomplete removal, allergy to anesthesia, and recurrence were addressed. Prior to the procedure, the treatment site was clearly identified and confirmed by the patient. All components of Universal Protocol/PAUSE Rule completed.

## 2024-02-19 ENCOUNTER — APPOINTMENT (OUTPATIENT)
Dept: URBAN - METROPOLITAN AREA CLINIC 298 | Age: 59
Setting detail: DERMATOLOGY
End: 2024-02-19

## 2024-02-19 VITALS — HEIGHT: 74 IN | WEIGHT: 265 LBS

## 2024-02-19 DIAGNOSIS — L20.89 OTHER ATOPIC DERMATITIS: ICD-10-CM

## 2024-02-19 DIAGNOSIS — L57.0 ACTINIC KERATOSIS: ICD-10-CM

## 2024-02-19 DIAGNOSIS — L81.4 OTHER MELANIN HYPERPIGMENTATION: ICD-10-CM

## 2024-02-19 DIAGNOSIS — D22 MELANOCYTIC NEVI: ICD-10-CM

## 2024-02-19 DIAGNOSIS — Z71.89 OTHER SPECIFIED COUNSELING: ICD-10-CM

## 2024-02-19 DIAGNOSIS — L82.1 OTHER SEBORRHEIC KERATOSIS: ICD-10-CM

## 2024-02-19 DIAGNOSIS — L82.0 INFLAMED SEBORRHEIC KERATOSIS: ICD-10-CM

## 2024-02-19 DIAGNOSIS — D18.0 HEMANGIOMA: ICD-10-CM

## 2024-02-19 DIAGNOSIS — Z85.828 PERSONAL HISTORY OF OTHER MALIGNANT NEOPLASM OF SKIN: ICD-10-CM

## 2024-02-19 PROBLEM — D18.01 HEMANGIOMA OF SKIN AND SUBCUTANEOUS TISSUE: Status: ACTIVE | Noted: 2024-02-19

## 2024-02-19 PROBLEM — D22.5 MELANOCYTIC NEVI OF TRUNK: Status: ACTIVE | Noted: 2024-02-19

## 2024-02-19 PROCEDURE — 17110 DESTRUCT B9 LESION 1-14: CPT

## 2024-02-19 PROCEDURE — OTHER PRESCRIPTION: OTHER

## 2024-02-19 PROCEDURE — 17003 DESTRUCT PREMALG LES 2-14: CPT | Mod: 59

## 2024-02-19 PROCEDURE — OTHER COUNSELING: OTHER

## 2024-02-19 PROCEDURE — OTHER PRESCRIPTION MEDICATION MANAGEMENT: OTHER

## 2024-02-19 PROCEDURE — OTHER LIQUID NITROGEN: OTHER

## 2024-02-19 PROCEDURE — 99213 OFFICE O/P EST LOW 20 MIN: CPT | Mod: 25

## 2024-02-19 PROCEDURE — OTHER MIPS QUALITY: OTHER

## 2024-02-19 PROCEDURE — 17000 DESTRUCT PREMALG LESION: CPT | Mod: 59

## 2024-02-19 RX ORDER — CLOBETASOL PROPIONATE 0.5 MG/G
CREAM TOPICAL
Qty: 30 | Refills: 4 | Status: ERX

## 2024-02-19 ASSESSMENT — LOCATION SIMPLE DESCRIPTION DERM
LOCATION SIMPLE: RIGHT THUMB
LOCATION SIMPLE: RIGHT UPPER BACK
LOCATION SIMPLE: LEFT FOREARM
LOCATION SIMPLE: LEFT PRETIBIAL REGION
LOCATION SIMPLE: ABDOMEN
LOCATION SIMPLE: RIGHT PRETIBIAL REGION
LOCATION SIMPLE: RIGHT HAND
LOCATION SIMPLE: UPPER BACK

## 2024-02-19 ASSESSMENT — LOCATION DETAILED DESCRIPTION DERM
LOCATION DETAILED: EPIGASTRIC SKIN
LOCATION DETAILED: RIGHT RADIAL DORSAL HAND
LOCATION DETAILED: SUPERIOR THORACIC SPINE
LOCATION DETAILED: LEFT PROXIMAL PRETIBIAL REGION
LOCATION DETAILED: LEFT PROXIMAL DORSAL FOREARM
LOCATION DETAILED: 1ST WEB SPACE RIGHT HAND
LOCATION DETAILED: RIGHT PROXIMAL PRETIBIAL REGION
LOCATION DETAILED: INFERIOR THORACIC SPINE
LOCATION DETAILED: RIGHT MID-UPPER BACK
LOCATION DETAILED: RIGHT MEDIAL UPPER BACK
LOCATION DETAILED: LEFT DISTAL DORSAL FOREARM

## 2024-02-19 ASSESSMENT — LOCATION ZONE DERM
LOCATION ZONE: HAND
LOCATION ZONE: TRUNK
LOCATION ZONE: FINGER
LOCATION ZONE: LEG
LOCATION ZONE: ARM

## 2024-02-19 NOTE — PROCEDURE: LIQUID NITROGEN
Herman (driver) [phone]  
Render Note In Bullet Format When Appropriate: No
Show Applicator Variable?: Yes
Consent: The patient's consent was obtained including but not limited to risks of crusting, scabbing, blistering, scarring, darker or lighter pigmentary change, recurrence, incomplete removal and infection.
Detail Level: Zone
Post-Care Instructions: I reviewed with the patient in detail post-care instructions. Patient is to wear sunprotection, and avoid picking at any of the treated lesions. Pt may apply Vaseline to crusted or scabbing areas.
Duration Of Freeze Thaw-Cycle (Seconds): 0
Spray Paint Text: The liquid nitrogen was applied to the skin utilizing a spray paint frosting technique.
Medical Necessity Information: It is in your best interest to select a reason for this procedure from the list below. All of these items fulfill various CMS LCD requirements except the new and changing color options.
Medical Necessity Clause: This procedure was medically necessary because the lesions that were treated were:
Detail Level: Detailed

## 2024-02-19 NOTE — PROCEDURE: PRESCRIPTION MEDICATION MANAGEMENT
Continue Regimen: clobetasol 0.05 % topical cream \\nQuantity: 30.0 g  Days Supply: 30\\nSig: Apply to affected areas twice a day for 2 weeks. Stop using for 1 week before restarting cycle. Moisturize after. Do not apply to face. STEROID
Render In Strict Bullet Format?: No
Detail Level: Zone

## 2024-09-30 NOTE — PROCEDURE: MEDICATION COUNSELING
A1C of 5.8  Discussed diet and exercise to control BG   Thalidomide Counseling: I discussed with the patient the risks of thalidomide including but not limited to birth defects, anxiety, weakness, chest pain, dizziness, cough and severe allergy.

## 2025-04-14 ENCOUNTER — APPOINTMENT (OUTPATIENT)
Dept: URBAN - METROPOLITAN AREA CLINIC 298 | Age: 60
Setting detail: DERMATOLOGY
End: 2025-04-14

## 2025-04-14 VITALS — HEIGHT: 72 IN | WEIGHT: 265 LBS

## 2025-04-14 DIAGNOSIS — L82.1 OTHER SEBORRHEIC KERATOSIS: ICD-10-CM

## 2025-04-14 DIAGNOSIS — Z85.828 PERSONAL HISTORY OF OTHER MALIGNANT NEOPLASM OF SKIN: ICD-10-CM

## 2025-04-14 DIAGNOSIS — L81.4 OTHER MELANIN HYPERPIGMENTATION: ICD-10-CM

## 2025-04-14 DIAGNOSIS — L90.5 SCAR CONDITIONS AND FIBROSIS OF SKIN: ICD-10-CM

## 2025-04-14 DIAGNOSIS — D18.0 HEMANGIOMA: ICD-10-CM

## 2025-04-14 DIAGNOSIS — D22 MELANOCYTIC NEVI: ICD-10-CM

## 2025-04-14 DIAGNOSIS — L57.0 ACTINIC KERATOSIS: ICD-10-CM

## 2025-04-14 DIAGNOSIS — I78.8 OTHER DISEASES OF CAPILLARIES: ICD-10-CM

## 2025-04-14 DIAGNOSIS — L57.3 POIKILODERMA OF CIVATTE: ICD-10-CM

## 2025-04-14 DIAGNOSIS — Z71.89 OTHER SPECIFIED COUNSELING: ICD-10-CM

## 2025-04-14 PROBLEM — D22.5 MELANOCYTIC NEVI OF TRUNK: Status: ACTIVE | Noted: 2025-04-14

## 2025-04-14 PROBLEM — D18.01 HEMANGIOMA OF SKIN AND SUBCUTANEOUS TISSUE: Status: ACTIVE | Noted: 2025-04-14

## 2025-04-14 PROCEDURE — 17000 DESTRUCT PREMALG LESION: CPT

## 2025-04-14 PROCEDURE — 17003 DESTRUCT PREMALG LES 2-14: CPT

## 2025-04-14 PROCEDURE — OTHER ADDITIONAL NOTES: OTHER

## 2025-04-14 PROCEDURE — OTHER LIQUID NITROGEN: OTHER

## 2025-04-14 PROCEDURE — 99213 OFFICE O/P EST LOW 20 MIN: CPT | Mod: 25

## 2025-04-14 PROCEDURE — OTHER COUNSELING: OTHER

## 2025-04-14 PROCEDURE — OTHER MIPS QUALITY: OTHER

## 2025-04-14 ASSESSMENT — LOCATION SIMPLE DESCRIPTION DERM
LOCATION SIMPLE: RIGHT PRETIBIAL REGION
LOCATION SIMPLE: SCALP
LOCATION SIMPLE: LEFT HAND
LOCATION SIMPLE: POSTERIOR NECK
LOCATION SIMPLE: UPPER BACK
LOCATION SIMPLE: ABDOMEN
LOCATION SIMPLE: LEFT FOREARM
LOCATION SIMPLE: LEFT PLANTAR SURFACE
LOCATION SIMPLE: UPPER LIP

## 2025-04-14 ASSESSMENT — LOCATION DETAILED DESCRIPTION DERM
LOCATION DETAILED: LEFT POSTERIOR NECK
LOCATION DETAILED: RIGHT CENTRAL PARIETAL SCALP
LOCATION DETAILED: RIGHT LATERAL PROXIMAL PRETIBIAL REGION
LOCATION DETAILED: RIGHT DISTAL PRETIBIAL REGION
LOCATION DETAILED: EPIGASTRIC SKIN
LOCATION DETAILED: LEFT SUPERIOR PARIETAL SCALP
LOCATION DETAILED: LEFT ARCH
LOCATION DETAILED: LEFT DISTAL DORSAL FOREARM
LOCATION DETAILED: SUPERIOR THORACIC SPINE
LOCATION DETAILED: RIGHT POSTERIOR NECK
LOCATION DETAILED: RIGHT LATERAL DISTAL PRETIBIAL REGION
LOCATION DETAILED: LEFT RADIAL DORSAL HAND
LOCATION DETAILED: INFERIOR THORACIC SPINE
LOCATION DETAILED: RIGHT SUPERIOR PARIETAL SCALP
LOCATION DETAILED: PHILTRUM

## 2025-04-14 ASSESSMENT — LOCATION ZONE DERM
LOCATION ZONE: LIP
LOCATION ZONE: HAND
LOCATION ZONE: NECK
LOCATION ZONE: ARM
LOCATION ZONE: FEET
LOCATION ZONE: SCALP
LOCATION ZONE: TRUNK
LOCATION ZONE: LEG

## 2025-04-14 NOTE — PROCEDURE: LIQUID NITROGEN
Show Applicator Variable?: Yes
Render Note In Bullet Format When Appropriate: No
Consent: The patient's consent was obtained including but not limited to risks of crusting, scabbing, blistering, scarring, darker or lighter pigmentary change, recurrence, incomplete removal and infection.
Detail Level: Zone
Duration Of Freeze Thaw-Cycle (Seconds): 0
Post-Care Instructions: I reviewed with the patient in detail post-care instructions. Patient is to wear sunprotection, and avoid picking at any of the treated lesions. Pt may apply Vaseline to crusted or scabbing areas.

## 2025-04-14 NOTE — PROCEDURE: ADDITIONAL NOTES
Render Risk Assessment In Note?: no
Additional Notes: Discussed possible ILK at a later date
Detail Level: Zone